# Patient Record
Sex: MALE | Race: WHITE | URBAN - METROPOLITAN AREA
[De-identification: names, ages, dates, MRNs, and addresses within clinical notes are randomized per-mention and may not be internally consistent; named-entity substitution may affect disease eponyms.]

---

## 2017-10-18 ENCOUNTER — HOSPITAL ENCOUNTER (EMERGENCY)
Facility: CLINIC | Age: 27
Discharge: HOME | End: 2017-10-19
Attending: INTERNAL MEDICINE | Admitting: INTERNAL MEDICINE
Payer: COMMERCIAL

## 2017-10-18 ENCOUNTER — NURSE TRIAGE (OUTPATIENT)
Dept: NURSING | Facility: CLINIC | Age: 27
End: 2017-10-18

## 2017-10-18 DIAGNOSIS — T14.8XXA LOCAL INFECTION OF WOUND: ICD-10-CM

## 2017-10-18 DIAGNOSIS — L02.414 ABSCESS OF LEFT ARM: ICD-10-CM

## 2017-10-18 DIAGNOSIS — L03.114 CELLULITIS OF LEFT ARM: ICD-10-CM

## 2017-10-18 DIAGNOSIS — F19.11: ICD-10-CM

## 2017-10-18 DIAGNOSIS — L08.9 LOCAL INFECTION OF WOUND: ICD-10-CM

## 2017-10-18 PROCEDURE — 76882 US LMTD JT/FCL EVL NVASC XTR: CPT | Performed by: INTERNAL MEDICINE

## 2017-10-18 PROCEDURE — 10060 I&D ABSCESS SIMPLE/SINGLE: CPT | Performed by: INTERNAL MEDICINE

## 2017-10-18 PROCEDURE — 99284 EMERGENCY DEPT VISIT MOD MDM: CPT | Mod: 25 | Performed by: INTERNAL MEDICINE

## 2017-10-18 PROCEDURE — 76882 US LMTD JT/FCL EVL NVASC XTR: CPT | Mod: 26 | Performed by: INTERNAL MEDICINE

## 2017-10-18 PROCEDURE — 10060 I&D ABSCESS SIMPLE/SINGLE: CPT | Mod: Z6 | Performed by: INTERNAL MEDICINE

## 2017-10-18 RX ORDER — GABAPENTIN 100 MG/1
100 CAPSULE ORAL 2 TIMES DAILY
COMMUNITY

## 2017-10-18 ASSESSMENT — ENCOUNTER SYMPTOMS: FEVER: 0

## 2017-10-18 NOTE — ED AVS SNAPSHOT
[facility], Emergency Department    [address]    [facility] [place] [address]    Phone:  [phone]    Fax:  [phone]                                       Mr. Ken Chiang   MRN: [id number]    Department:  [facility], Emergency Department   Date of Visit:  10/18/2017           Patient Information     Date Of Birth          1990        Your diagnoses for this visit were:     Local infection of wound     Intravenous drug abuse in remission        You were seen by Sandrita Maria MD.        Discharge Instructions       Please make an appointment to follow up with Your Primary Care Provider in 3-7 days even if entirely better.     Discharge References/Attachments     SKIN INFECTION, CELLULITIS (ENGLISH)      24 Hour Appointment Hotline       To make an appointment at any [facility] [place] clinic, call [phone] ([phone]). If you don't have a family doctor or clinic, we will help you find one. [facility] [place] clinics are conveniently located to serve the needs of you and your family.             Review of your medicines      START taking        Dose / Directions Last dose taken    cephALEXin 500 MG capsule   Commonly known as:  KEFLEX   Dose:  500 mg   Quantity:  40 capsule        Take 1 capsule (500 mg) by mouth 4 times daily for 10 days   Refills:  0        naproxen 500 MG tablet   Commonly known as:  NAPROSYN   Dose:  500 mg   Quantity:  30 tablet        Take 1 tablet (500 mg) by mouth 2 times daily as needed for moderate pain   Refills:  0          Our records show that you are taking the medicines listed below. If these are incorrect, please call your family doctor or clinic.        Dose / Directions Last dose taken    gabapentin 100 MG capsule   Commonly known as:  NEURONTIN   Dose:  100 mg        Take 100 mg by mouth 2 times daily   Refills:  0                Prescriptions were sent or printed at these locations (2 Prescriptions)                   Other Prescriptions                Printed at  "Department/Unit printer (2 of 2)         cephALEXin (KEFLEX) 500 MG capsule               naproxen (NAPROSYN) 500 MG tablet                Procedures and tests performed during your visit     Elbow  XR, G/E 3 views, left    Incision and drainage    POC US SOFT TISSUE      Orders Needing Specimen Collection     None      Pending Results     No orders found for last 3 day(s).            Pending Culture Results     No orders found for last 3 day(s).            Pending Results Instructions     If you had any lab results that were not finalized at the time of your Discharge, you can call the ED Lab Result RN at [phone]. You will be contacted by this team for any positive Lab results or changes in treatment. The nurses are available 7 days a week from 10A to 6:30P.  You can leave a message 24 hours per day and they will return your call.        Thank you for choosing [facility]       Thank you for choosing [facility] for your care. Our goal is always to provide you with excellent care. Hearing back from our patients is one way we can continue to improve our services. Please take a few minutes to complete the written survey that you may receive in the mail after you visit with us. Thank you!        [employer] Information     [employer] lets you send messages to your doctor, view your test results, renew your prescriptions, schedule appointments and more. To sign up, go to [email or web address] . Click on \"Log in\" on the left side of the screen, which will take you to the Welcome page. Then click on \"Sign up Now\" on the right side of the page.     You will be asked to enter the access code listed below, as well as some personal information. Please follow the directions to create your username and password.     Your access code is: [email or web address]  Expires: 2018  1:29 AM     Your access code will  in 90 days. If you need help or a new code, please call your [facility] clinic or [phone].        Care EveryWhere ID     " This is your Care EveryWhere ID. This could be used by other organizations to access your [facility] medical records  [id number]        Equal Access to Services     MADI QUIJANO : Derrick Perea, mikayla head, zenaida knight, jessica escobar. So [facility] [phone].    ATENCIÓN: Si habla español, tiene a louie disposición servicios gratuitos de asistencia lingüística. Llame al [phone].    We comply with applicable federal civil rights laws and [place] laws. We do not discriminate on the basis of race, color, national origin, age, disability, sex, sexual orientation, or gender identity.            After Visit Summary       This is your record. Keep this with you and show to your community pharmacist(s) and doctor(s) at your next visit.

## 2017-10-18 NOTE — ED AVS SNAPSHOT
[facility], Emergency Department    [address]    [facility] [address]    Phone:  [phone]    Fax:  [phone]                                       Mr. Ken Chiang   MRN: [id number]    Department:  [facility], Emergency Department   Date of Visit:  10/18/2017           After Visit Summary Signature Page     I have received my discharge instructions, and my questions have been answered. I have discussed any challenges I see with this plan with the nurse or doctor.    ..........................................................................................................................................  Patient/Patient Representative Signature      ..........................................................................................................................................  Patient Representative Print Name and Relationship to Patient    ..................................................               ................................................  Date                                            Time    ..........................................................................................................................................  Reviewed by Signature/Title    ...................................................              ..............................................  Date                                                            Time

## 2017-10-19 ENCOUNTER — APPOINTMENT (OUTPATIENT)
Dept: GENERAL RADIOLOGY | Facility: CLINIC | Age: 27
End: 2017-10-19
Attending: INTERNAL MEDICINE
Payer: COMMERCIAL

## 2017-10-19 VITALS
DIASTOLIC BLOOD PRESSURE: 88 MMHG | WEIGHT: 153 LBS | RESPIRATION RATE: 16 BRPM | TEMPERATURE: 98.6 F | HEART RATE: 86 BPM | OXYGEN SATURATION: 100 % | SYSTOLIC BLOOD PRESSURE: 132 MMHG

## 2017-10-19 PROCEDURE — 25000132 ZZH RX MED GY IP 250 OP 250 PS 637: Performed by: EMERGENCY MEDICINE

## 2017-10-19 PROCEDURE — 73080 X-RAY EXAM OF ELBOW: CPT | Mod: LT

## 2017-10-19 RX ORDER — IBUPROFEN 800 MG/1
800 TABLET, FILM COATED ORAL ONCE
Status: COMPLETED | OUTPATIENT
Start: 2017-10-19 | End: 2017-10-19

## 2017-10-19 RX ORDER — CEPHALEXIN 500 MG/1
500 CAPSULE ORAL 4 TIMES DAILY
Qty: 40 CAPSULE | Refills: 0 | Status: SHIPPED | OUTPATIENT
Start: 2017-10-19 | End: 2017-10-29

## 2017-10-19 RX ORDER — NAPROXEN 500 MG/1
500 TABLET ORAL 2 TIMES DAILY PRN
Qty: 30 TABLET | Refills: 0 | Status: SHIPPED | OUTPATIENT
Start: 2017-10-19

## 2017-10-19 RX ADMIN — IBUPROFEN 800 MG: 800 TABLET ORAL at 00:43

## 2017-10-19 NOTE — ED PROVIDER NOTES
History     Chief Complaint   Patient presents with     Wound Infection     pimple to left arm infected, has been on antibiotics for 3 days, patient reports redness, swelling, and pain gotten worse since starting antibiotics     HPI  Ken Chiang is a 27 year old male with a history of anxiety who presents with concern for worsening infection. The patient reports that he initially developed a small bump in his left antecubital space this weekend. He notes that he initially popped it. However, it worsened and on Monday, 2 days ago, and he was evaluated by his primary physician for it at that time. He notes that he was started on an antibiotic, however, he is unable to recall the name of it. Despite taking the antibiotics, he has continued to have worsening pain, redness and swelling in this area, prompting his arrival to the ED. He denies any history of diabetes or HIV. He notes that he recently started taking gabapentin for anxiety. He denies any illicit drug use, notes that he has used IV drugs in the past, though denies recent use. He denies fever.    History reviewed. No pertinent past medical history.    History reviewed. No pertinent surgical history.    No family history on file.    Social History   Substance Use Topics     Smoking status: Former Smoker     Smokeless tobacco: Never Used     Alcohol use No     No current facility-administered medications for this encounter.      Current Outpatient Prescriptions   Medication     cephALEXin (KEFLEX) 500 MG capsule     naproxen (NAPROSYN) 500 MG tablet     gabapentin (NEURONTIN) 100 MG capsule      No Known Allergies     I have reviewed the Medications, Allergies, Past Medical and Surgical History, and Social History in the Epic system.    Review of Systems   Constitutional: Negative for fever.   Skin:        Positive for left antecubital abscess.   All other systems reviewed and are negative.      Physical Exam   BP: 134/79  Pulse: 90  Temp: 98.6  F (37  " C)  Resp: 16  Weight: 69.4 kg (153 lb)  SpO2: 98 %      Physical Exam   Constitutional: No distress.   HENT:   Head: Atraumatic.   Mouth/Throat: Oropharynx is clear and moist. No oropharyngeal exudate.   Eyes: Pupils are equal, round, and reactive to light. No scleral icterus.   Cardiovascular: Normal heart sounds and intact distal pulses.    Pulmonary/Chest: Breath sounds normal. No respiratory distress.   Abdominal: Soft. Bowel sounds are normal. There is no tenderness.   Musculoskeletal: He exhibits no edema.        Left elbow: He exhibits swelling. He exhibits normal range of motion, no effusion, no deformity and no laceration. Tenderness found.        Arms:  Skin: Skin is warm. No rash noted. He is not diaphoretic.       ED Course     ED Course     Incision + drainage  Date/Time: 10/19/2017 1:24 AM  Performed by: RODRIGO ANDERSON  Authorized by: RODRIGO ANDERSON   Consent: Verbal consent obtained. Written consent not obtained.  Risks and benefits: risks, benefits and alternatives were discussed  Consent given by: patient  Patient understanding: patient states understanding of the procedure being performed  Patient identity confirmed: verbally with patient  Time out: Immediately prior to procedure a \"time out\" was called to verify the correct patient, procedure, equipment, support staff and site/side marked as required.  Type: abscess  Body area: upper extremity  Location details: left arm  Anesthesia: local infiltration    Anesthesia:  Local Anesthetic: lidocaine 1% with epinephrine  Anesthetic total: 2 mL    Sedation:  Patient sedated: no  Scalpel size: 11  Incision type: single straight  Incision depth: dermal  Complexity: simple  Drainage: purulent  Drainage amount: scant  Wound treatment: wound left open  Packing material: none  Patient tolerance: Patient tolerated the procedure well with no immediate complications           11:01 PM  The patient was seen and examined by Dr. Anderson in Room 20.    Results for " orders placed during the hospital encounter of 10/18/17   POC US SOFT TISSUE    Impression Limited Soft Tissue Ultrasound, performed and interpreted by me.    Indication:  Skin redness warmth pain. Evaluate for cellulitis vs abscess.     Body location: left upper extremity    Findings:  There is cobblestoning suggestive of cellulitis in the evaluated area. There is a fluid collection measuring minimal to suggest abscess.      IMPRESSION: minimal abscess and mainly Cellulitis                   Labs Ordered and Resulted from Time of ED Arrival Up to the Time of Departure from the ED - No data to display         Assessments & Plan (with Medical Decision Making)  Left antcubital wound infection mainly cellulitis with mini abscess, had been on bactrim, will add keflex, complicated with h/o IVDA heroin cocaine, XR neg for FB, follow up with his PMD within one week. Naproxen prn for pain.       I have reviewed the nursing notes.    I have reviewed the findings, diagnosis, plan and need for follow up with the patient.    New Prescriptions    CEPHALEXIN (KEFLEX) 500 MG CAPSULE    Take 1 capsule (500 mg) by mouth 4 times daily for 10 days    NAPROXEN (NAPROSYN) 500 MG TABLET    Take 1 tablet (500 mg) by mouth 2 times daily as needed for moderate pain       Final diagnoses:   Local infection of wound   Intravenous drug abuse in remission     ICarolina, am serving as a trained medical scribe to document services personally performed by Sandrita Maria MD, based on the provider's statements to me.   Sandrita CISSE MD, was physically present and have reviewed and verified the accuracy of this note documented by Carolina Moreno.     10/18/2017   [facility], [place], EMERGENCY DEPARTMENT     Sandrita Maria MD  10/19/17 0126

## 2017-10-19 NOTE — TELEPHONE ENCOUNTER
"  Reason for Disposition    [1] Taking antibiotics > 24 hours AND [2] symptoms WORSE    Additional Information    Negative: [1] Recent hospitalization for pneumonia AND [2] taking an antibiotic    Negative: [1] Animal bite infection AND [2] taking an antibiotic    Negative: [1] Ear  infection (Otitis Media) AND [2] taking an antibiotic    Negative: [1] Ear  infection (Swimmer's Ear)) AND [2] taking an antibiotic    Negative: [1] Sinus infection AND [2] taking an antibiotic    Negative: [1] Strep throat AND [2] taking an antibiotic    Negative: [1] Urinary tract  infection (e.g., cystitis, pyelonephritis, urethritis, epididymitis) AND [2] male AND [3] taking an antibiotic    Negative: [1] Urinary tract  infection (e.g., cystitis, pyelonephritis, urethritis) AND [2] female AND [3] taking an antibiotic    Negative: [1] Wound infection AND [2] taking an antibiotic    Negative: MODERATE difficulty breathing (e.g., speaks in phrases, SOB even at rest, pulse 100-120)    Negative: Fever > 103 F (39.4 C)    Negative: Patient sounds very sick or weak to the triager    Answer Assessment - Initial Assessment Questions  1. INFECTION: \"What infection is the antibiotic being given for?\"      MRSA  2. ANTIBIOTIC: \"What antibiotic are you taking\" \"How many times per day?\"      Not sure not with him in the car  3. DURATION: \"When was the antibiotic started?\"      Day 3 today  4. MAIN CONCERN OR SYMPTOM:  \"What is your main concern right now?\"      Increased redness and pain in the forearm where the infection is  5. BETTER-SAME-WORSE: \"Are you getting better, staying the same, or getting worse compared to when you first started the antibiotics?\" If getting worse, ask: \"In what way?\"       Worsening each day  6. FEVER: \"Do you have a fever?\" If so, ask: \"What is your temperature, how was it measured, and when did it start?\"      No fever that he can tell  7. SYMPTOMS: \"Are there any other symptoms you're concerned about?\" If so, ask: " "\"When did it start?\"      Increased redness, significant increase in pain  8. FOLLOW-UP APPOINTMENT: \"Do you have a follow-up appointment with your doctor?\"      None currently scheduled, He saw his primary care provider through [facility] who prescribed the antibiotics but they are not open and he wants to be seen tonight as [facility].    Protocols used: INFECTION ON ANTIBIOTIC FOLLOW-UP CALL-ADULT-      Charley Fisher RN, BSN  [facility]    "